# Patient Record
Sex: FEMALE | Race: WHITE
[De-identification: names, ages, dates, MRNs, and addresses within clinical notes are randomized per-mention and may not be internally consistent; named-entity substitution may affect disease eponyms.]

---

## 2017-05-30 ENCOUNTER — HOSPITAL ENCOUNTER (OUTPATIENT)
Dept: HOSPITAL 47 - RADMAMWWP | Age: 62
Discharge: HOME | End: 2017-05-30
Payer: COMMERCIAL

## 2017-05-30 DIAGNOSIS — Z85.3: Primary | ICD-10-CM

## 2017-05-30 NOTE — MM
Reason for exam: follow-up at short interval from prior study.

Last mammogram was performed 5 months ago.



History:

Patient is postmenopausal and has history of breast cancer at age 48.

Lumpectomy of the right breast.  Chemotherapy.  Radiation therapy of the right 

breast.

Took hormonal contraceptives for 6 months.  Took antineoplastic for 5 years 

beginning at age 49.  Took other hormone for 5 years beginning at age 54.



Physical Findings:

Nurse did not find any significant physical abnormalities on exam.



MG Diagnostic Mammo w CAD SERGIO

Bilateral CC and MLO view(s) were taken.

Prior study comparison: December 20, 2016, right breast MG diagnostic mammo RT w 

CAD.  May 16, 2016, bilateral MG diagnostic mammo w CAD SERGIO.

The breast tissue is heterogeneously dense. This may lower the sensitivity of 

mammography.  Asymmetric breast tissue greater in the left breast. There is 

chronic nodularity in the right breast. Post surgical clips and changes in the 

right breast upper outer quadrant.

No significant new findings when compared with previous films.



These results were verbally communicated with the patient and result sheet given 

to the patient on 5/30/17.





ASSESSMENT: Benign, BI-RAD 2



RECOMMENDATION:

Follow-up diagnostic mammogram of both breasts in 1 year.

## 2018-06-27 ENCOUNTER — HOSPITAL ENCOUNTER (OUTPATIENT)
Dept: HOSPITAL 47 - RADMAMWWP | Age: 63
Discharge: HOME | End: 2018-06-27
Payer: COMMERCIAL

## 2018-06-27 DIAGNOSIS — Z85.3: ICD-10-CM

## 2018-06-27 DIAGNOSIS — Z08: Primary | ICD-10-CM

## 2018-06-27 PROCEDURE — 77066 DX MAMMO INCL CAD BI: CPT

## 2018-06-28 NOTE — MM
Reason for exam: additional evaluation requested from prior study.

Last mammogram was performed 1 year and 1 month ago.



History:

Patient is postmenopausal and has history of breast cancer at age 48.

Lumpectomy of the right breast.  Chemotherapy.  Radiation therapy of the right 

breast.

Took hormonal contraceptives for 6 months.  Took antineoplastic for 5 years 

beginning at age 49.  Took other hormone for 5 years beginning at age 54.



Physical Findings:

Nurse did not find any significant physical abnormalities on exam.



MG Diagnostic Mammo w CAD SERGIO

Bilateral CC and MLO view(s) were taken.  XCCL view(s) were taken of the right 

breast.

Prior study comparison: May 30, 2017, bilateral MG diagnostic mammo w CAD SERGIO.  

December 20, 2016, right breast MG diagnostic mammo RT w CAD.

The breast tissue is heterogeneously dense. This may lower the sensitivity of 

mammography.

Finding: Architectural distortion in the inner quadrant, posterior position of the

right breast consistent with known lumpectomy changes redemonstrated.  There is 

no discrete abnormality.



These results were verbally communicated with the patient and result sheet given 

to the patient on 6/27/18.





ASSESSMENT: Benign, BI-RAD 2



RECOMMENDATION:

Follow-up diagnostic mammogram of both breasts in 1 year.